# Patient Record
Sex: MALE | Race: BLACK OR AFRICAN AMERICAN
[De-identification: names, ages, dates, MRNs, and addresses within clinical notes are randomized per-mention and may not be internally consistent; named-entity substitution may affect disease eponyms.]

---

## 2019-01-25 ENCOUNTER — HOSPITAL ENCOUNTER (EMERGENCY)
Dept: HOSPITAL 72 - EMR | Age: 54
Discharge: HOME | End: 2019-01-25
Payer: SELF-PAY

## 2019-01-25 VITALS — WEIGHT: 158 LBS | HEIGHT: 74 IN | BODY MASS INDEX: 20.28 KG/M2

## 2019-01-25 VITALS — SYSTOLIC BLOOD PRESSURE: 154 MMHG | DIASTOLIC BLOOD PRESSURE: 93 MMHG

## 2019-01-25 DIAGNOSIS — T22.211A: Primary | ICD-10-CM
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
## 2019-01-25 DIAGNOSIS — T31.0: ICD-10-CM

## 2019-01-25 DIAGNOSIS — Z23: ICD-10-CM
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
## 2019-01-25 PROCEDURE — 90471 IMMUNIZATION ADMIN: CPT

## 2019-01-25 PROCEDURE — 99283 EMERGENCY DEPT VISIT LOW MDM: CPT

## 2019-01-25 PROCEDURE — 90715 TDAP VACCINE 7 YRS/> IM: CPT
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
## 2019-01-25 NOTE — NUR
ED Nurse Note:

Pt has seen by Dr. Aiken, Dressing applied and Tetanus given.  Pt is ready 
for discharged. Discharge instruction and  prescription given to pt and 
verbalized understanding. ID band removed.

  Pt discharge from ED with steady gait. Accompanied by his family.
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
